# Patient Record
Sex: FEMALE | Race: WHITE
[De-identification: names, ages, dates, MRNs, and addresses within clinical notes are randomized per-mention and may not be internally consistent; named-entity substitution may affect disease eponyms.]

---

## 2018-01-18 ENCOUNTER — HOSPITAL ENCOUNTER (OUTPATIENT)
Dept: HOSPITAL 47 - LABPAT | Age: 43
Discharge: HOME | End: 2018-01-18
Payer: COMMERCIAL

## 2018-01-18 ENCOUNTER — HOSPITAL ENCOUNTER (OUTPATIENT)
Dept: HOSPITAL 47 - RADMAMWWP | Age: 43
Discharge: HOME | End: 2018-01-18
Payer: COMMERCIAL

## 2018-01-18 DIAGNOSIS — Z01.812: Primary | ICD-10-CM

## 2018-01-18 DIAGNOSIS — N92.0: ICD-10-CM

## 2018-01-18 DIAGNOSIS — Z12.31: Primary | ICD-10-CM

## 2018-01-18 LAB
BASOPHILS # BLD AUTO: 0.1 K/UL (ref 0–0.2)
BASOPHILS NFR BLD AUTO: 1 %
EOSINOPHIL # BLD AUTO: 0.2 K/UL (ref 0–0.7)
EOSINOPHIL NFR BLD AUTO: 2 %
ERYTHROCYTE [DISTWIDTH] IN BLOOD BY AUTOMATED COUNT: 5 M/UL (ref 3.8–5.4)
ERYTHROCYTE [DISTWIDTH] IN BLOOD: 13.4 % (ref 11.5–15.5)
HCT VFR BLD AUTO: 43.2 % (ref 34–46)
HGB BLD-MCNC: 13 GM/DL (ref 11.4–16)
LYMPHOCYTES # SPEC AUTO: 1.6 K/UL (ref 1–4.8)
LYMPHOCYTES NFR SPEC AUTO: 19 %
MCH RBC QN AUTO: 25.9 PG (ref 25–35)
MCHC RBC AUTO-ENTMCNC: 30 G/DL (ref 31–37)
MCV RBC AUTO: 86.4 FL (ref 80–100)
MONOCYTES # BLD AUTO: 0.5 K/UL (ref 0–1)
MONOCYTES NFR BLD AUTO: 6 %
NEUTROPHILS # BLD AUTO: 5.7 K/UL (ref 1.3–7.7)
NEUTROPHILS NFR BLD AUTO: 70 %
PLATELET # BLD AUTO: 256 K/UL (ref 150–450)
WBC # BLD AUTO: 8.2 K/UL (ref 3.8–10.6)

## 2018-01-18 PROCEDURE — 36415 COLL VENOUS BLD VENIPUNCTURE: CPT

## 2018-01-18 PROCEDURE — 85025 COMPLETE CBC W/AUTO DIFF WBC: CPT

## 2018-01-18 PROCEDURE — 77067 SCR MAMMO BI INCL CAD: CPT

## 2018-01-18 PROCEDURE — 77063 BREAST TOMOSYNTHESIS BI: CPT

## 2018-01-19 NOTE — MM
Reason for exam: screening  (asymptomatic).

Last mammogram was performed 2 years and 11 months ago.



History:

Family history of breast cancer in aunt at age 55.

Took hormonal contraceptives for 13 years beginning at age 19.



Physical Findings:

A clinical breast exam by your physician is recommended on an annual basis and 

results should be correlated with mammographic findings.



MG 3D Screening Mammo W/Cad

Bilateral CC and MLO view(s) were taken.

Prior study comparison: February 25, 2015, bilateral MG screening mammo w CAD.

There are scattered fibroglandular densities.  Asymmetric breast tissue in the 

left breast.





ASSESSMENT: Incomplete: need additional imaging evaluation, BI-RAD 0



RECOMMENDATION:

Ultrasound of the left breast.





[facility] will attempt to contact patient  to return for ultrasound.

## 2018-01-25 NOTE — HP
HISTORY AND PHYSICAL



DATE OF ADMISSION:

2018



HISTORY:

This is a 42-year-old  2, para 2 woman with a history of menometrorrhagia who

desires surgical management in the form of hysteroscopy and NovaSure endometrial

ablation.  She has a history of a liver mass and was taken off of her oral

contraceptive pills secondary to that.  This has resulted in heavy and bothersome

bleeding.  Periods occur every 24 to 28 days and last 5 days.  They are heavy with

clotting.



ALLERGIES:

NONE.



MEDICATIONS:

1. Lexapro 20 mg daily.

2. Synthroid daily.

3. Vitamin D daily.



PAST MEDICAL HISTORY:

1. Benign liver mass.

2. Depression.

3. Hypothyroidism.

4. Migraine headaches.



PAST SURGICAL HISTORY:

 sections in  and .



OB/GYN HISTORY:

No history of abnormal Pap smears or STDs.  She is a G2, P2, as described above.



FAMILY HISTORY:

Noncontributory.



SOCIAL HISTORY:

She is .  Negative for tobacco, alcohol and drug use.



REVIEW OF SYSTEMS:

Positive for fatigue, menometrorrhagia and dysmenorrhea.  Otherwise, remainder of

review of systems is negative.



PHYSICAL EXAMINATION:

Blood pressure 148/100, repeat 142/86, weight 200 pounds, height 5 feet 3 inches.

GENERAL:  This is a pleasant  female in no apparent distress.

HEENT exam is unremarkable with no palpable lymphadenopathy or thyromegaly.

The lungs are clear to auscultation bilaterally.

The heart has a regular rate and rhythm.

ABDOMEN:  Obese, soft and nontender.  She has hepatomegaly noted.

On pelvic examination, she has normal female external genitalia without lesions or

irritation.  On speculum examination, the cervix appears grossly normal.  The cervix

and uterus are freely mobile, small and in the midline.  There are no adnexal

abnormalities appreciated.

On neurologic exam, there are no gross focal deficits noted. Mood and affect are

normal.



Endometrial biopsy was performed and was benign.  Pelvic ultrasound reveals normal

uterus and bilateral adnexa.



ASSESSMENT:

[age]-year-old  2, para 2 woman with menometrorrhagia and dysmenorrhea who

is scheduled to undergo NovaSure endometrial ablation with diagnostic hysteroscopy.

This procedure, anticipated recovery course, risks and postoperative bleeding profile

have been reviewed with the patient.  Risks include but are not limited to bleeding,

transfusion, infection, uterine perforation with injury to pelvic and abdominal

structures.  The patient understands these risks and agrees to proceed.  She is

scheduled to undergo the above-named procedure on 2018.





MMODL / IJN: [id number] / Job#: [id number]

## 2018-01-26 ENCOUNTER — HOSPITAL ENCOUNTER (OUTPATIENT)
Dept: HOSPITAL 47 - RADUSWWP | Age: 43
Discharge: HOME | End: 2018-01-26
Payer: COMMERCIAL

## 2018-01-26 DIAGNOSIS — R92.8: Primary | ICD-10-CM

## 2018-01-26 NOTE — USB
Reason for exam: additional evaluation requested from abnormal screening.



History:

Family history of breast cancer in aunt at age 55.

Took hormonal contraceptives for 13 years beginning at age 19.



Physical Findings:

Nurse did not find any significant physical abnormalities on exam.



US Breast LT

Left breast ultrasound includes all four quadrants, the retroareolar region and 

axilla. Finding demonstrates no cystic or solid lesion seen.



These results were verbally communicated with the patient and result sheet given 

to the patient on 1/26/18.





ASSESSMENT: Negative, BI-RAD 1



RECOMMENDATION:

Return to routine screening mammogram schedule for both breasts.

## 2018-01-29 ENCOUNTER — HOSPITAL ENCOUNTER (OUTPATIENT)
Dept: HOSPITAL 47 - OR | Age: 43
Discharge: HOME | End: 2018-01-29
Payer: COMMERCIAL

## 2018-01-29 VITALS — RESPIRATION RATE: 16 BRPM

## 2018-01-29 VITALS — SYSTOLIC BLOOD PRESSURE: 124 MMHG | DIASTOLIC BLOOD PRESSURE: 76 MMHG | HEART RATE: 76 BPM

## 2018-01-29 VITALS — BODY MASS INDEX: 35.4 KG/M2

## 2018-01-29 VITALS — TEMPERATURE: 97.5 F

## 2018-01-29 DIAGNOSIS — F32.9: ICD-10-CM

## 2018-01-29 DIAGNOSIS — N94.6: ICD-10-CM

## 2018-01-29 DIAGNOSIS — K21.9: ICD-10-CM

## 2018-01-29 DIAGNOSIS — N92.1: Primary | ICD-10-CM

## 2018-01-29 DIAGNOSIS — E03.9: ICD-10-CM

## 2018-01-29 DIAGNOSIS — Z79.899: ICD-10-CM

## 2018-01-29 PROCEDURE — 81025 URINE PREGNANCY TEST: CPT

## 2018-01-29 PROCEDURE — 58563 HYSTEROSCOPY ABLATION: CPT

## 2019-03-21 NOTE — P.OP
3/21/19: successful ISAMAR/DCCV AF->NSR, mod-sev MR noted with normal LV fxn.    Interval Hx: still SOB, but improved.  No cp/palps/LH.    Tele: SR (personally reviewed and interpreted)     Date of Procedure: 01/29/18


Preoperative Diagnosis: 


Menorrhagia


Postoperative Diagnosis: 


Same


Procedure(s) Performed: 


Diagnostic hysteroscopy with NovaSure endometrial ablation


Anesthesia: MAC


Surgeon: Mirta May


Estimated Blood Loss (ml): 0


IV fluids (ml): 400


Urine output (ml): 25


Pathology: none sent


Condition: stable


Disposition: PACU


Operative Findings: 


Normal intrauterine cavity.


Description of Procedure: 


After the patient and her  were met in the preoperative holding area and 

all questions were answered, she was taken to the operating room where 

anesthetic was administered without incident.  She was in positioned, prepped 

and draped in the dorsal lithotomy position.  Bladder was drained for 

approximately 25 mL of clear urine.  Speculum was placed in the vagina and the 

cervix was grasped anteriorly with a single-tooth tenaculum.  Paracervical 

block with lidocaine plus epinephrine was placed.  The uterus is sounded to 9 

cm.  The cervix was then sequentially dilated with Hegar dilators to allow for 

passage of the diagnostic hysteroscope.  Hysteroscope was introduced and a 

normal endometrial cavity was appreciated.  Bilateral tubal ostia were 

visualized.  There are no gross intracavitary lesions noted.  The hysteroscope 

was removed and the cervix was further dilated to allow for passage of the 

NovaSure ablation device.  The device was inserted with a cavity length of 5 cm

, width of 4.0 cm.  Cavity assessment test was passed and the device was 

enabled.  Treatment cycle was 105 seconds at a power of 110 W.  Following 

cessation of the treatment cycle the device was removed and the hysteroscope 

was reinserted.  Complete desiccation of the endometrium was appreciated.  

Instruments were then removed from the cervix which was observed.  No active 

bleeding was noted.  Speculum was removed.  The patient was awoken from 

anesthetic without incident and transported recovery area in stable condition.  

All counts reported to me as correct.

## 2019-07-12 ENCOUNTER — HOSPITAL ENCOUNTER (OUTPATIENT)
Dept: HOSPITAL 47 - RADMAMWWP | Age: 44
Discharge: HOME | End: 2019-07-12
Attending: OBSTETRICS & GYNECOLOGY
Payer: COMMERCIAL

## 2019-07-12 DIAGNOSIS — Z12.31: Primary | ICD-10-CM

## 2019-07-12 PROCEDURE — 77063 BREAST TOMOSYNTHESIS BI: CPT

## 2019-07-12 PROCEDURE — 77067 SCR MAMMO BI INCL CAD: CPT

## 2019-07-15 NOTE — MM
Reason for exam: screening  (asymptomatic).

Last mammogram was performed 1 year and 6 months ago.



History:

Family history of breast cancer in aunt at age 55.

Took hormonal contraceptives for 13 years beginning at age 19.



Physical Findings:

A clinical breast exam by your physician is recommended on an annual basis and 

results should be correlated with mammographic findings.



MG 3D Screening Mammo W/Cad

Bilateral CC, MLO, and XCCL view(s) were taken.

Prior study comparison: January 18, 2018, bilateral MG 3d screening mammo w/cad.  

February 25, 2015, bilateral MG screening mammo w CAD.

The breast tissue is heterogeneously dense. This may lower the sensitivity of 

mammography.  Benign appearing calcifications in the right breast.  No significant

changes when compared with prior studies.





ASSESSMENT: Benign, BI-RAD 2



RECOMMENDATION:

Routine screening mammogram of both breasts in 1 year.

## 2021-01-07 ENCOUNTER — HOSPITAL ENCOUNTER (OUTPATIENT)
Dept: HOSPITAL 47 - RADUSWWP | Age: 46
Discharge: HOME | End: 2021-01-07
Attending: FAMILY MEDICINE
Payer: COMMERCIAL

## 2021-01-07 DIAGNOSIS — R10.2: Primary | ICD-10-CM

## 2021-01-07 PROCEDURE — 76830 TRANSVAGINAL US NON-OB: CPT

## 2021-01-07 NOTE — US
EXAMINATION TYPE: US transvaginal

 

DATE OF EXAM: 1/7/2021

 

COMPARISON: NONE

 

CLINICAL HISTORY: R10.2 pelvic and perineal pain.

Date of LMP:  Patient hasn't had menses since ablation 3 years prior

 

EXAM MEASUREMENTS:

 

Uterus:  9.2 x 4.1 x 4.2 cm

Endometrial Stripe: 0.5 cm

Right Ovary:  Obscured by overlying bowel gas

Left Ovary:  Obscured by overlying bowel gas

 

 

Large body habitus. 

 

1. Uterus:  Anteverted   wnl

2. Endometrium:  wnl

3. Right Ovary:  Obscured by overlying bowel gas

4. Left Ovary:  Obscured by overlying bowel gas

5. Bilateral Adnexa:  wnl

6. Posterior cul-de-sac: wnl

 

 

 

IMPRESSION: 

1. Normal pelvic ultrasound
Attending Attestation (For Attendings USE Only)...

## 2022-02-24 ENCOUNTER — HOSPITAL ENCOUNTER (OUTPATIENT)
Dept: HOSPITAL 47 - RADMAMWWP | Age: 47
Discharge: HOME | End: 2022-02-24
Attending: OBSTETRICS & GYNECOLOGY
Payer: COMMERCIAL

## 2022-02-24 DIAGNOSIS — Z80.3: ICD-10-CM

## 2022-02-24 DIAGNOSIS — Z12.31: Primary | ICD-10-CM

## 2022-02-24 PROCEDURE — 77063 BREAST TOMOSYNTHESIS BI: CPT

## 2022-02-24 PROCEDURE — 77067 SCR MAMMO BI INCL CAD: CPT

## 2022-02-28 NOTE — MM
Reason for exam: screening  (asymptomatic).

Last mammogram was performed 2 years and 7 months ago.



History:

Family history of breast cancer in aunt at age 55.

Took hormonal contraceptives for 13 years beginning at age 19.



Physical Findings:

A clinical breast exam by your physician is recommended on an annual basis and 

results should be correlated with mammographic findings.



MG 3D Screening Mammo W/Cad

Bilateral CC and MLO view(s) were taken.

Prior study comparison: July 12, 2019, bilateral MG 3d screening mammo w/cad.  

January 18, 2018, bilateral MG 3d screening mammo w/cad.

The breast tissue is heterogeneously dense. This may lower the sensitivity of 

mammography.  No significant changes when compared with prior studies.





ASSESSMENT: Benign, BI-RAD 2



RECOMMENDATION:

Routine screening mammogram of both breasts in 1 year.

## 2023-03-21 ENCOUNTER — HOSPITAL ENCOUNTER (OUTPATIENT)
Dept: HOSPITAL 47 - RADMAMWWP | Age: 48
Discharge: HOME | End: 2023-03-21
Attending: FAMILY MEDICINE
Payer: COMMERCIAL

## 2023-03-21 DIAGNOSIS — Z12.31: Primary | ICD-10-CM

## 2023-03-21 DIAGNOSIS — Z80.3: ICD-10-CM

## 2023-03-21 PROCEDURE — 77067 SCR MAMMO BI INCL CAD: CPT

## 2023-03-21 PROCEDURE — 77063 BREAST TOMOSYNTHESIS BI: CPT

## 2023-03-22 NOTE — MM
Reason for Exam: Screening  (asymptomatic). 

Last mammogram was performed 1 year(s) and 1 month(s) ago. 





Patient History: 

Menarche at age 12. First Full-Term Pregnancy at age 19. Hormonal Contraceptives, starting at age 19

for 13 years.

Maternal aunt had breast cancer, age 55. 





Risk Values: 

Anita 5 year model risk: 0.8%.

NCI Lifetime model risk: 8.3%.





Prior Study Comparison: 

1/18/2018 Bilateral Screening Mammogram, [facility]. 7/12/2019 Bilateral Screening Mammogram, [facility].

2/24/2022 Bilateral Screening Mammogram, [facility]. 





Tissue Density: 

The breast tissue is heterogeneously dense. This may lower the sensitivity of mammography.





Findings: 

Analyzed By CAD. 

There is no suspicious group of microcalcifications or new suspicious mass in either breast. 





Overall Assessment: Negative, BI-RAD 1





Management: 

Screening Mammogram of both breasts in 1 year.

A clinical breast exam by your physician is recommended on an annual basis and results should be

correlated with mammographic findings.



Electronically signed and approved by: Leopold M. Fregoli, M.D. Radiologis

## 2024-10-25 ENCOUNTER — HOSPITAL ENCOUNTER (EMERGENCY)
Dept: HOSPITAL 47 - EC | Age: 49
Discharge: HOME | End: 2024-10-25
Payer: COMMERCIAL

## 2024-10-25 VITALS — SYSTOLIC BLOOD PRESSURE: 150 MMHG | DIASTOLIC BLOOD PRESSURE: 90 MMHG | HEART RATE: 100 BPM

## 2024-10-25 VITALS — RESPIRATION RATE: 18 BRPM | TEMPERATURE: 97.9 F

## 2024-10-25 LAB
ALBUMIN SERPL-MCNC: 5 G/DL (ref 3.5–5)
ALP SERPL-CCNC: 72 U/L (ref 38–126)
ALT SERPL-CCNC: 17 U/L (ref 4–34)
ANION GAP SERPL CALC-SCNC: 12 MMOL/L
APTT BLD: 22.4 SEC (ref 22–30)
AST SERPL-CCNC: 27 U/L (ref 14–36)
BASOPHILS # BLD AUTO: 0.1 K/UL (ref 0–0.2)
BASOPHILS NFR BLD AUTO: 0 %
BUN SERPL-SCNC: 9 MG/DL (ref 7–17)
CALCIUM SPEC-MCNC: 11 MG/DL (ref 8.4–10.2)
CHLORIDE SERPL-SCNC: 103 MMOL/L (ref 98–107)
CO2 SERPL-SCNC: 23 MMOL/L (ref 22–30)
EOSINOPHIL # BLD AUTO: 0.1 K/UL (ref 0–0.7)
EOSINOPHIL NFR BLD AUTO: 1 %
ERYTHROCYTE [DISTWIDTH] IN BLOOD BY AUTOMATED COUNT: 5.01 M/UL (ref 3.8–5.4)
ERYTHROCYTE [DISTWIDTH] IN BLOOD: 13.1 % (ref 11.5–15.5)
GLUCOSE SERPL-MCNC: 106 MG/DL (ref 74–99)
HCT VFR BLD AUTO: 45.2 % (ref 34–46)
HGB BLD-MCNC: 14.4 GM/DL (ref 11.4–16)
INR PPP: 0.9 (ref ?–1.2)
LIPASE SERPL-CCNC: 104 U/L (ref 23–300)
LIPASE SERPL-CCNC: 98 U/L (ref 23–300)
LYMPHOCYTES # SPEC AUTO: 1.9 K/UL (ref 1–4.8)
LYMPHOCYTES NFR SPEC AUTO: 15 %
MAGNESIUM SPEC-SCNC: 1.6 MG/DL (ref 1.6–2.3)
MCH RBC QN AUTO: 28.7 PG (ref 25–35)
MCHC RBC AUTO-ENTMCNC: 31.8 G/DL (ref 31–37)
MCV RBC AUTO: 90.3 FL (ref 80–100)
MONOCYTES # BLD AUTO: 0.6 K/UL (ref 0–1)
MONOCYTES NFR BLD AUTO: 5 %
NEUTROPHILS # BLD AUTO: 9.7 K/UL (ref 1.3–7.7)
NEUTROPHILS NFR BLD AUTO: 77 %
NT-PROBNP SERPL-MCNC: 82 PG/ML
PLATELET # BLD AUTO: 247 K/UL (ref 150–450)
POTASSIUM SERPL-SCNC: 4.2 MMOL/L (ref 3.5–5.1)
PROT SERPL-MCNC: 8.6 G/DL (ref 6.3–8.2)
PT BLD: 10.1 SEC (ref 10–12.5)
SODIUM SERPL-SCNC: 138 MMOL/L (ref 137–145)
WBC # BLD AUTO: 12.5 K/UL (ref 3.8–10.6)

## 2024-10-25 PROCEDURE — 36415 COLL VENOUS BLD VENIPUNCTURE: CPT

## 2024-10-25 PROCEDURE — 85610 PROTHROMBIN TIME: CPT

## 2024-10-25 PROCEDURE — 83735 ASSAY OF MAGNESIUM: CPT

## 2024-10-25 PROCEDURE — 84443 ASSAY THYROID STIM HORMONE: CPT

## 2024-10-25 PROCEDURE — 71275 CT ANGIOGRAPHY CHEST: CPT

## 2024-10-25 PROCEDURE — 93005 ELECTROCARDIOGRAM TRACING: CPT

## 2024-10-25 PROCEDURE — 71046 X-RAY EXAM CHEST 2 VIEWS: CPT

## 2024-10-25 PROCEDURE — 99285 EMERGENCY DEPT VISIT HI MDM: CPT

## 2024-10-25 PROCEDURE — 85025 COMPLETE CBC W/AUTO DIFF WBC: CPT

## 2024-10-25 PROCEDURE — 85379 FIBRIN DEGRADATION QUANT: CPT

## 2024-10-25 PROCEDURE — 80053 COMPREHEN METABOLIC PANEL: CPT

## 2024-10-25 PROCEDURE — 85730 THROMBOPLASTIN TIME PARTIAL: CPT

## 2024-10-25 PROCEDURE — 83690 ASSAY OF LIPASE: CPT

## 2024-10-25 PROCEDURE — 74177 CT ABD & PELVIS W/CONTRAST: CPT

## 2024-10-25 PROCEDURE — 83880 ASSAY OF NATRIURETIC PEPTIDE: CPT

## 2024-10-25 PROCEDURE — 84484 ASSAY OF TROPONIN QUANT: CPT

## 2024-10-25 NOTE — CT
EXAMINATION TYPE: CT abdomen pelvis w con

CT DLP: 1848.8 mGycm, Automated exposure control for dose reduction was used.

 

DATE OF EXAM: 10/25/2024 7:35 PM

 

COMPARISON: 10/6/2015

 

CLINICAL INDICATION: Female, 49 years old with history of pe; heart palps that began last night at 20
00. Reports chest heaviness, feet tingling, L arm pain, and lower back pain today. Hx of liver mass

 

TECHNIQUE:  Axial CT abdomen pelvis w con;Sagittal and coronal reformats were created on a separate w
orkstation. 

 

Contrast used:100ml mL of Isovue 370 with IV Contrast, (none if empty)

Oral contrast used: without Oral Contrast (none if empty)

 

FINDINGS: 

LOWER CHEST: Unremarkable

 

ABDOMEN

LIVER: Diffusely hypoattenuating parenchyma.

GALLBLADDER AND BILE DUCTS: Unremarkable.

PANCREAS: Unremarkable.

SPLEEN: Unremarkable.

ADRENAL GLANDS: Unremarkable.

KIDNEYS AND URETERS: No evidence of hydronephrosis or renal calculus. The ureters are unremarkable.  


 

PELVIS

BLADDER: No evidence for wall thickening or mass given limitations of exam.

REPRODUCTIVE: Bilateral ovarian cysts measuring 20 mm on 9 18 mm on the left.

 

ABDOMEN & PELVIS

STOMACH AND BOWEL: No evidence of bowel obstruction. Appendix is normal.

PERITONEUM/RETROPERITONEUM: No evidence of pneumoperitoneum or free fluid.

VASCULATURE: Mild atherosclerotic calcifications are present throughout the abdominal aorta and its b
ranches. No evidence of aortic aneurysm. 

MUSCULOSKELETAL: No acute osseous abnormalities. Mild disc degeneration changes are present throughou
t the thoracolumbar spine. Grade 1 anterolisthesis of L4 and L5. 

No evidence for significant spinal canal neural foraminal stenosis. L2-L3 and L1-L2 disc bulging with
 at least mild spinal canal stenosis. The neural foramen are patent.

 

LYMPH NODES: No gross evidence for lymphadenopathy.

SOFT TISSUE/ABDOMINAL WALL: Unremarkable

 

IMPRESSION:

1.  No evidence for acute abdominal process. The appendix is normal. No obstructive uropathy or renal
 calculi.

2.  Hepatic steatosis. 

3.  Grade 1 anterolisthesis of L4 and L5. 

4.  No evidence for significant spinal canal neural foraminal stenosis. 

5.  L2-L3 and L1-L2 disc bulging with at least mild spinal canal stenosis. 

 

[facility], Workstation: [id number], 10/25/2024 7:56 PM

## 2024-10-25 NOTE — ED
Arrhythmia/Palpitations HPI





- General


Source: patient, RN notes reviewed





<Jennifer Terry - Last Filed: 10/25/24 16:30>





- General


Source: patient, RN notes reviewed, old records reviewed


Mode of arrival: ambulatory


Limitations: no limitations





- History of Present Illness


MD Complaint: rapid heart beat, "heart racing", palpitations


-: hour(s)


Context: occurred during rest


Arrhythmia History: other (0)


Associated Symptoms: denies other symptoms


Treatments Prior to Arrival: other (0)





<Perry Monson - Last Filed: 10/25/24 20:15>





- General


Stated Complaint: heart palpitaions/chest pressure


Time Seen by Provider: 10/25/24 16:30





- History of Present Illness


Initial Comments: 


Note49-year-old female history of anxiety, hypertension, high cholesterol 

presents emerged part chief complaint of heart palpitations over the past 24 

hours.  States she has also been experiencing chest pressure, intermittent jennifer

rtness of breath and numbness and tingling to her bilateral upper extremities.  

Endorses nausea.  Denies personal history of MI


 (Jennifer Terry)


This is a 49-year-old female to the ER for evaluation, patient having multiple 

complaints of palpitations chest pain back pain pain down the left arm pain into

her chest pain into her back pain into her jaw. (Perry Monson)





- Related Data


                                Home Medications











 Medication  Instructions  Recorded  Confirmed


 


Levothyroxine Sodium [Synthroid] 100 mcg PO DAILY 14


 


Escitalopram [Lexapro] 20 mg PO HS 18


 


Aspirin/Acetaminophen/Caffeine 1 each PO AS DIRECTED PRN 18





[Excedrin Migraine Caplet]   


 


Ibuprofen 400 mg PO ONCE PRN 18











                                    Allergies











Allergy/AdvReac Type Severity Reaction Status Date / Time


 


No Known Allergies Allergy   Verified 10/25/24 16:36














Review of Systems


ROS Other: All systems not noted in ROS Statement are negative.





<Jennifer Terry - Last Filed: 10/25/24 16:30>


ROS Other: All systems not noted in ROS Statement are negative.





<Perry Monson - Last Filed: 10/25/24 20:15>


ROS Statement: 


Those systems with pertinent positive or pertinent negative responses have been 

documented in the HPI.








Past Medical History


Past Medical History: GERD/Reflux, Thyroid Disorder


Additional Past Medical History / Comment(s): MASS ON LIVER-MONITORED WITH 

MRI'S, HYPO -THYROID, STATES CHANGE IN BOWEL MOVEMENTS (6 PER DAY), PAIN LEFT 

HIP AREA., UNABLE TO WALK DISTANCE AND PAIN STARTS LEFT HIP AND RADIATES TO HER 

BACK.


History of Any Multi-Drug Resistant Organisms: None Reported


Past Surgical History:  Section, Uterine Ablation


Additional Past Surgical History / Comment(s): C/S x2


Past Anesthesia/Blood Transfusion Reactions: Motion Sickness, Postoperative 

Nausea & Vomiting (PONV)


Past Psychological History: Anxiety


Past Alcohol Use History: None Reported


Past Drug Use History: None Reported





- Past Family History


  ** Mother


Family Medical History: Cancer


Additional Family Medical History / Comment(s): cervical cancer





  ** Sister(s)


Family Medical History: Cancer


Additional Family Medical History / Comment(s): bladder cancer





<Jennifer Terry - Last Filed: 10/25/24 16:30>





General Exam





<Jennifer Terry - Last Filed: 10/25/24 16:30>


General appearance: alert, in no apparent distress


Head exam: Present: atraumatic, normocephalic, normal inspection


Eye exam: Present: normal appearance, PERRL, EOMI.  Absent: scleral icterus, 

conjunctival injection, periorbital swelling


ENT exam: Present: normal exam, mucous membranes moist


Neck exam: Present: normal inspection.  Absent: tenderness, meningismus, 

lymphadenopathy


Respiratory exam: Present: normal lung sounds bilaterally.  Absent: respiratory 

distress, wheezes, rales, rhonchi, stridor


Cardiovascular Exam: Present: regular rate, normal rhythm, normal heart sounds. 

Absent: systolic murmur, diastolic murmur, rubs, gallop, clicks


GI/Abdominal exam: Present: soft, normal bowel sounds.  Absent: distended, 

tenderness, guarding, rebound, rigid


Extremities exam: Present: normal inspection, full ROM, normal capillary refill.

 Absent: tenderness, pedal edema, joint swelling, calf tenderness


Back exam: Present: normal inspection


Neurological exam: Present: alert, oriented X3, CN II-XII intact


Psychiatric exam: Present: normal affect, normal mood


Skin exam: Present: warm, dry, intact, normal color.  Absent: rash





<Perry Monson B - Last Filed: 10/25/24 20:15>





- General Exam Comments


Initial Comments: 


Visual Physical Exam


 


Vital signs reviewed


 


General: Well-appearing, nontoxic, no acute distress.


Head: Normocephalic, atraumatic


Eyes: PERRLA, EOMI


ENT: Airway patent


Chest: Nonlabored breathing


Skin: No visual rash, normal skin tone


Neuro: Alert and oriented 3


Musculoskeletal: No gross abnormalities





 (Stieler,Jennifer)





Course





<Perry Monson - Last Filed: 10/25/24 20:15>


                                   Vital Signs











  10/25/24





  16:36


 


Temperature 97.9 F


 


Pulse Rate 104 H


 


Respiratory 18





Rate 


 


Blood Pressure 158/96


 


O2 Sat by Pulse 95





Oximetry 














- Reevaluation(s)


Reevaluation #1: 





10/25/24 18:39


Medical records reviewed (Perry Monson)


Reevaluation #2: 





10/25/24 18:39


Patient symptoms unchanged (Perry Monson)


Reevaluation #3: 





10/25/24 18:39


Patient informed of results questions answered (Perry Monson)


Reevaluation #4: 





Was pt. sent in by a medical professional or institution (, PA, NP, urgent 

care, hospital, or nursing home...) When possible be specific


@  -no


Did you speak to anyone other than the patient for history (EMS, parent, family,

police, friend...)? What history was obtained from this source 


@  -no


Did you review nursing and triage notes (agree or disagree)?  Why? 


@  -agree


Are old charts reviewed (outside hosp., previous admission, EMS record, old EKG,

old radiological studies, urgent care reports/EKG's, nursing home records)? 

Report findings 


@  -yes


Differential Diagnosis (chest pain, altered mental status, abdominal pain women,

abdominal pain men, vaginal bleeding, weakness, fever, dyspnea, syncope, 

headache, dizziness, GI bleed, back pain, seizure, CVA, palpatations, mental 

health, musculoskeletal)? 


@  -prior


EKG interpreted by me (3pts min.).


@  -yes


X-rays interpreted by me (1pt min.).


@  -yes negative for acute disease


CT interpreted by me (1pt min.).


@  -no


U/S interpreted by me (1pt. min.).


@  -no


What testing was considered but not performed or refused? (CT, X-rays, U/S, 

labs)? Why?


@  -none


What meds were considered but not given or refused? Why?


@  -none


Did you discuss the management of the patient with other professionals 

(professionals i.e. , PA, NP, lab, RT, psych nurse, , , 

teacher, , )? Give summary


@  -no


Was smoking cessation discussed for >3mins.?


@  -no


Was critical care preformed (if so, how long)?


@  -no


Were there social determinants of health that impacted care today? How? 

(Homelessness, low income, unemployed, alcoholism, drug addiction, 

transportation, low edu. Level, literacy, decrease access to med. care, alf, 

rehab)?


@  -none


Was there de-escalation of care discussed even if they declined (Discuss DNR or 

withdrawal of care, Hospice)? DNR status


@  -no


What co-morbidities impacted this encounter? (DM, HTN, Smoking, COPD, CAD, 

Cancer, CVA, ARF, Chemo, Hep., AIDS, mental health diagnosis, sleep apnea, 

morbid obesity)?


@  -none


Was patient admitted / discharged? Hospital course, mention meds given and 

route, prescriptions, significant lab abnormalities, going to OR and other 

pertinent info.


@  - 


Undiagnosed new problem with uncertain prognosis?


@  -no


Drug Therapy requiring intensive monitoring for toxicity (Heparin, Nitro, 

Insulin, Cardizem)?


@  -no


Were any procedures done?


@  -no


Diagnosis/symptom?


@  -


Acute, or Chronic, or Acute on Chronic?


@  -Acute


Uncomplicated (without systemic symptoms) or Complicated (systemic symptoms)?


@  -Complicated


Side effects of treatment?


@  -no


Exacerbation, Progression, or Severe Exacerbation?


@  -exacerbation


Poses a threat to life or bodily function? How? (Chest pain, USA, MI, pneumonia,

PE, COPD, DKA, ARF, appy, cholecystitis, CVA, Diverticulitis, Homicidal, 

Suicidal, threat to staff... and all critical care pts)


@  -yes (Perry Monson)


Reevaluation #5: 





Differential Palpitations


Ventricular arrhythmias, atrial arrhythmias, myocardial infarction, anemia, 

thyrotoxicosis, electrolyte imbalance, hypokalemia, pulmonary embolism, 

pulmonary disease, drugs, alcohol, anxiety, stress....


This is not meant to be an all-inclusive list.


 (Perry Monson)





EKG Findings





- EKG Comments:


EKG Findings:: EKG is sinus 97  QRS 86 QTc 377





- EKG Results:


EKG: interpreted by ERMD





<Perry Monson - Last Filed: 10/25/24 20:15>





Medical Decision Making





<Jennifer Terry - Last Filed: 10/25/24 16:30>





- Lab Data


Result diagrams: 


                                 10/25/24 16:50





                                 10/25/24 16:50





- Radiology Data


Radiology results: report reviewed (Chest x-ray is negative for acute disease), 

image reviewed





<Perry Monson - Last Filed: 10/25/24 20:15>





- Medical Decision Making


I completed the quick note portion of this chart signed Jennifer Terry PA-C


 (Jennifer Terry)





- Lab Data


                                   Lab Results











  10/25/24 10/25/24 10/25/24 Range/Units





  16:50 16:50 16:50 


 


WBC  12.5 H    (3.8-10.6)  k/uL


 


RBC  5.01    (3.80-5.40)  m/uL


 


Hgb  14.4    (11.4-16.0)  gm/dL


 


Hct  45.2    (34.0-46.0)  %


 


MCV  90.3    (80.0-100.0)  fL


 


MCH  28.7    (25.0-35.0)  pg


 


MCHC  31.8    (31.0-37.0)  g/dL


 


RDW  13.1    (11.5-15.5)  %


 


Plt Count  247    (150-450)  k/uL


 


MPV  8.7    


 


Neutrophils %  77    %


 


Lymphocytes %  15    %


 


Monocytes %  5    %


 


Eosinophils %  1    %


 


Basophils %  0    %


 


Neutrophils #  9.7 H    (1.3-7.7)  k/uL


 


Lymphocytes #  1.9    (1.0-4.8)  k/uL


 


Monocytes #  0.6    (0-1.0)  k/uL


 


Eosinophils #  0.1    (0-0.7)  k/uL


 


Basophils #  0.1    (0-0.2)  k/uL


 


PT   10.1   (10.0-12.5)  sec


 


INR   0.9   (<1.2)  


 


APTT   22.4   (22.0-30.0)  sec


 


D-Dimer     (<0.60)  mg/L FEU


 


Sodium    138  (137-145)  mmol/L


 


Potassium    4.2  (3.5-5.1)  mmol/L


 


Chloride    103  ()  mmol/L


 


Carbon Dioxide    23  (22-30)  mmol/L


 


Anion Gap    12  mmol/L


 


BUN    9  (7-17)  mg/dL


 


Creatinine    0.60  (0.52-1.04)  mg/dL


 


Est GFR (CKD-EPI)AfAm    >90  (>60 ml/min/1.73 sqM)  


 


Est GFR (CKD-EPI)NonAf    >90  (>60 ml/min/1.73 sqM)  


 


Glucose    106 H  (74-99)  mg/dL


 


Calcium    11.0 H  (8.4-10.2)  mg/dL


 


Magnesium    1.6  (1.6-2.3)  mg/dL


 


Total Bilirubin    0.8  (0.2-1.3)  mg/dL


 


AST    27  (14-36)  U/L


 


ALT    17  (4-34)  U/L


 


Alkaline Phosphatase    72  ()  U/L


 


Troponin I     (0.000-0.034)  ng/mL


 


NT-Pro-B Natriuret Pep     pg/mL


 


Total Protein    8.6 H  (6.3-8.2)  g/dL


 


Albumin    5.0  (3.5-5.0)  g/dL


 


Lipase    104  ()  U/L


 


TSH     (0.465-4.680)  mIU/L














  10/25/24 10/25/24 10/25/24 Range/Units





  16:50 18:15 18:15 


 


WBC     (3.8-10.6)  k/uL


 


RBC     (3.80-5.40)  m/uL


 


Hgb     (11.4-16.0)  gm/dL


 


Hct     (34.0-46.0)  %


 


MCV     (80.0-100.0)  fL


 


MCH     (25.0-35.0)  pg


 


MCHC     (31.0-37.0)  g/dL


 


RDW     (11.5-15.5)  %


 


Plt Count     (150-450)  k/uL


 


MPV     


 


Neutrophils %     %


 


Lymphocytes %     %


 


Monocytes %     %


 


Eosinophils %     %


 


Basophils %     %


 


Neutrophils #     (1.3-7.7)  k/uL


 


Lymphocytes #     (1.0-4.8)  k/uL


 


Monocytes #     (0-1.0)  k/uL


 


Eosinophils #     (0-0.7)  k/uL


 


Basophils #     (0-0.2)  k/uL


 


PT     (10.0-12.5)  sec


 


INR     (<1.2)  


 


APTT     (22.0-30.0)  sec


 


D-Dimer   0.37   (<0.60)  mg/L FEU


 


Sodium     (137-145)  mmol/L


 


Potassium     (3.5-5.1)  mmol/L


 


Chloride     ()  mmol/L


 


Carbon Dioxide     (22-30)  mmol/L


 


Anion Gap     mmol/L


 


BUN     (7-17)  mg/dL


 


Creatinine     (0.52-1.04)  mg/dL


 


Est GFR (CKD-EPI)AfAm     (>60 ml/min/1.73 sqM)  


 


Est GFR (CKD-EPI)NonAf     (>60 ml/min/1.73 sqM)  


 


Glucose     (74-99)  mg/dL


 


Calcium     (8.4-10.2)  mg/dL


 


Magnesium     (1.6-2.3)  mg/dL


 


Total Bilirubin     (0.2-1.3)  mg/dL


 


AST     (14-36)  U/L


 


ALT     (4-34)  U/L


 


Alkaline Phosphatase     ()  U/L


 


Troponin I  <0.012    (0.000-0.034)  ng/mL


 


NT-Pro-B Natriuret Pep    82  pg/mL


 


Total Protein     (6.3-8.2)  g/dL


 


Albumin     (3.5-5.0)  g/dL


 


Lipase    98  ()  U/L


 


TSH    6.300 H  (0.465-4.680)  mIU/L














Disposition





<Jennifer Terry - Last Filed: 10/25/24 16:30>


Is patient prescribed a controlled substance at d/c from ED?: No


Time of Disposition: 20:00





<Perry Monson - Last Filed: 10/25/24 20:15>


Clinical Impression: 


 Atypical chest pain, Chest pain, Palpitations





Disposition: HOME SELF-CARE


Condition: Good


Instructions (If sedation given, give patient instructions):  Chest Pain (ED), 

Heart Palpitations (ED)


Referrals: 


Fam Medina DO [Primary Care Provider] - 1-2 days

## 2024-10-25 NOTE — XR
EXAMINATION TYPE: XR chest 2V

 

DATE OF EXAM: 10/25/2024

 

COMPARISON: None

 

HISTORY: 49-year-old female palpitations, chest pain

 

TECHNIQUE:  PA and lateral views

 

FINDINGS:  

The cardiomediastinal silhouette, aorta, and pulmonary vasculature are within normal limits. Lungs an
d pleural spaces are clear.

 

 

IMPRESSION:  

No acute cardiopulmonary process.

 

[facility], Workstation: [id number], 10/25/2024 5:36 PM

## 2024-10-25 NOTE — CT
EXAMINATION TYPE: CT angio chest

CT DLP: 1848.8 mGycm, Automated exposure control for dose reduction was used.

 

DATE OF EXAM: 10/25/2024 7:35 PM

 

COMPARISON: None

 

CLINICAL INDICATION: Female, 49 years old with history of pe; heart palps that began last night at 20
00. Reports chest heaviness, feet tingling, L arm pain, and lower back pain today.

 

TECHNIQUE/CONTRAST: 

CTA scan of the thorax is performed with IV Contrast, patient injected with 100ml mL of Isovue 370, M
IP images are created and reviewed these are created on a separate workstation..  

 

FINDINGS: 

Motion limited exam.

Pulmonary Artery: There is no evidence for a filling defect within the pulmonary vasculature to sugge
st acute pulmonary embolism.  The pulmonary artery is of normal size. 

Lungs/Pleura: No evidence of focal consolidation, pleural effusion or pneumothorax. 

Airway: Large airways are patent.

Heart: Heart is within normal limits for size.

Vasculature: No evidence of aortic aneurysm.

Mediastinum: No gross evidence of adenopathy.

Musculoskeletal: No acute osseous abnormalities

Soft Tissues/lymph nodes: Unremarkable.

Lower neck: No significant findings.

Upper Abdomen: Diffuse low-attenuation to the liver parenchyma..

 

IMPRESSION:

1. Motion limited exam, No evidence of pulmonary embolism.

2. No evidence for acute process.

 

Follow up recommendations for incidental pulmonary nodules, if there are any, are per Fleischner?s Am
[employer] Association or American College of Chest Physicians.

https://radiopaedia.org/articles/fleischner-society-pulmonary-nodule-recommendations-1?lang=us

 

[facility], Workstation: [id number], 10/25/2024 7:50 PM Patient notified of Dr. Mahmood's recommendations via portal message.       Joseph Mahmood MD  to [facility] Nurse Triage Pool        9:44 AM  At least a month, then she decrease to 1-2000 daily.

## 2025-02-27 ENCOUNTER — HOSPITAL ENCOUNTER (OUTPATIENT)
Dept: HOSPITAL 47 - RADMAMWWP | Age: 50
Discharge: HOME | End: 2025-02-27
Attending: FAMILY MEDICINE
Payer: COMMERCIAL

## 2025-02-27 DIAGNOSIS — Z80.3: ICD-10-CM

## 2025-02-27 DIAGNOSIS — Z12.31: Primary | ICD-10-CM

## 2025-02-27 DIAGNOSIS — R92.333: ICD-10-CM

## 2025-02-27 PROCEDURE — 77063 BREAST TOMOSYNTHESIS BI: CPT

## 2025-02-27 PROCEDURE — 77067 SCR MAMMO BI INCL CAD: CPT

## 2025-02-27 NOTE — MM
Reason for Exam: Screening  (asymptomatic). 

Last mammogram was performed 1 year(s) and 11 month(s) ago. 





Patient History: 

Menarche at age 12. First Full-Term Pregnancy at age 19. Hormonal Contraceptives, starting at age 19

for 13 years.

Maternal aunt had breast cancer, age 55. 





Risk Values: 

Anita 5 year model risk: 0.9%.

NCI Lifetime model risk: 8.0%.





Prior Study Comparison: 

7/12/2019 Bilateral Screening Mammogram, [facility]. 2/24/2022 Bilateral Screening Mammogram, [facility].

3/21/2023 Bilateral MG 3D screening mammo w/cad, [facility]. 





Tissue Density: 

The breasts are heterogeneously dense, which may obscure small masses.





Findings: 

Analyzed By CAD. 

Right breast: There is no suspicious group of microcalcifications or new suspicious mass.



Left breast: There is no suspicious group of microcalcifications or new suspicious mass. 





Overall Assessment: Negative, BI-RAD 1





Management: 

Screening Mammogram of both breasts in 1 year.

[facility] will attempt to contact patient to return for supplemental views and

ultrasound if indicated.



Patient should continue monthly self-breast exams.  A clinical breast exam by your physician is

recommended on an annual basis.

This exam should not preclude additional follow-up of suspicious palpable abnormalities.



Note on Anita scores and lifetime risk:

1. A Anita score greater than 3% is considered moderate risk. If this is the case, consider

specialist referral to assess eligibility for a risk reducing agent.

2. If overall lifetime risk for the development of breast cancer is 20% or higher, the patient may

qualify for future screening with alternating mammogram and breast MRI.



[facility], Workstation: [id number], 2/27/2025 8:43 AM.



Electronically signed and approved by: Emery Metz DO